# Patient Record
Sex: MALE | Race: WHITE | NOT HISPANIC OR LATINO | Employment: FULL TIME | URBAN - NONMETROPOLITAN AREA
[De-identification: names, ages, dates, MRNs, and addresses within clinical notes are randomized per-mention and may not be internally consistent; named-entity substitution may affect disease eponyms.]

---

## 2018-02-02 ENCOUNTER — OFFICE VISIT (OUTPATIENT)
Dept: FAMILY MEDICINE CLINIC | Facility: CLINIC | Age: 28
End: 2018-02-02

## 2018-02-02 VITALS
HEART RATE: 96 BPM | SYSTOLIC BLOOD PRESSURE: 110 MMHG | BODY MASS INDEX: 28.12 KG/M2 | OXYGEN SATURATION: 98 % | WEIGHT: 175 LBS | HEIGHT: 66 IN | DIASTOLIC BLOOD PRESSURE: 78 MMHG

## 2018-02-02 DIAGNOSIS — Z13.1 SCREENING FOR DIABETES MELLITUS: ICD-10-CM

## 2018-02-02 DIAGNOSIS — F51.05 INSOMNIA DUE TO OTHER MENTAL DISORDER: ICD-10-CM

## 2018-02-02 DIAGNOSIS — F22 PARANOIA (HCC): ICD-10-CM

## 2018-02-02 DIAGNOSIS — F40.01 AGORAPHOBIA WITH PANIC ATTACKS: ICD-10-CM

## 2018-02-02 DIAGNOSIS — R45.86 MOOD SWINGS: Primary | ICD-10-CM

## 2018-02-02 DIAGNOSIS — F99 INSOMNIA DUE TO OTHER MENTAL DISORDER: ICD-10-CM

## 2018-02-02 DIAGNOSIS — R45.4 IRRITABILITY: ICD-10-CM

## 2018-02-02 LAB
ALBUMIN SERPL-MCNC: 4.5 G/DL (ref 3.5–5)
ALBUMIN/GLOB SERPL: 1.7 G/DL (ref 1–2)
ALP SERPL-CCNC: 70 U/L (ref 38–126)
ALT SERPL-CCNC: 55 U/L (ref 13–69)
AST SERPL-CCNC: 35 U/L (ref 15–46)
BASOPHILS # BLD AUTO: 0.08 10*3/MM3 (ref 0–0.2)
BASOPHILS NFR BLD AUTO: 1.1 % (ref 0–2.5)
BILIRUB SERPL-MCNC: 0.4 MG/DL (ref 0.2–1.3)
BUN SERPL-MCNC: 11 MG/DL (ref 7–20)
BUN/CREAT SERPL: 13.8 (ref 6.3–21.9)
CALCIUM SERPL-MCNC: 9.6 MG/DL (ref 8.4–10.2)
CHLORIDE SERPL-SCNC: 104 MMOL/L (ref 98–107)
CO2 SERPL-SCNC: 24 MMOL/L (ref 26–30)
CREAT SERPL-MCNC: 0.8 MG/DL (ref 0.6–1.3)
EOSINOPHIL # BLD AUTO: 0.13 10*3/MM3 (ref 0–0.7)
EOSINOPHIL NFR BLD AUTO: 1.8 % (ref 0–7)
ERYTHROCYTE [DISTWIDTH] IN BLOOD BY AUTOMATED COUNT: 14.5 % (ref 11.5–14.5)
ERYTHROCYTE [SEDIMENTATION RATE] IN BLOOD BY WESTERGREN METHOD: 8 MM/HR (ref 0–15)
GFR SERPLBLD CREATININE-BSD FMLA CKD-EPI: 117 ML/MIN/1.73
GFR SERPLBLD CREATININE-BSD FMLA CKD-EPI: 142 ML/MIN/1.73
GLOBULIN SER CALC-MCNC: 2.7 GM/DL
GLUCOSE SERPL-MCNC: 85 MG/DL (ref 74–98)
HBA1C MFR BLD: 5.8 %
HCT VFR BLD AUTO: 42.3 % (ref 42–52)
HGB BLD-MCNC: 14.5 G/DL (ref 14–18)
IMM GRANULOCYTES # BLD: 0.02 10*3/MM3 (ref 0–0.06)
IMM GRANULOCYTES NFR BLD: 0.3 % (ref 0–0.6)
LYMPHOCYTES # BLD AUTO: 1.75 10*3/MM3 (ref 0.6–3.4)
LYMPHOCYTES NFR BLD AUTO: 23.9 % (ref 10–50)
MCH RBC QN AUTO: 29.1 PG (ref 27–31)
MCHC RBC AUTO-ENTMCNC: 34.3 G/DL (ref 30–37)
MCV RBC AUTO: 84.8 FL (ref 80–94)
MONOCYTES # BLD AUTO: 0.69 10*3/MM3 (ref 0–0.9)
MONOCYTES NFR BLD AUTO: 9.4 % (ref 0–12)
NEUTROPHILS # BLD AUTO: 4.66 10*3/MM3 (ref 2–6.9)
NEUTROPHILS NFR BLD AUTO: 63.5 % (ref 37–80)
NRBC BLD AUTO-RTO: 0 /100 WBC (ref 0–0)
PLATELET # BLD AUTO: 251 10*3/MM3 (ref 130–400)
POTASSIUM SERPL-SCNC: 4.2 MMOL/L (ref 3.5–5.1)
PROT SERPL-MCNC: 7.2 G/DL (ref 6.3–8.2)
RBC # BLD AUTO: 4.99 10*6/MM3 (ref 4.7–6.1)
SODIUM SERPL-SCNC: 143 MMOL/L (ref 137–145)
T4 FREE SERPL-MCNC: 0.88 NG/DL (ref 0.78–2.19)
TSH SERPL DL<=0.005 MIU/L-ACNC: 10.3 MIU/ML (ref 0.47–4.68)
VIT B12 SERPL-MCNC: 317 PG/ML (ref 239–931)
WBC # BLD AUTO: 7.33 10*3/MM3 (ref 4.8–10.8)

## 2018-02-02 PROCEDURE — 99204 OFFICE O/P NEW MOD 45 MIN: CPT | Performed by: FAMILY MEDICINE

## 2018-02-02 RX ORDER — DIVALPROEX SODIUM 250 MG/1
250 TABLET, DELAYED RELEASE ORAL 2 TIMES DAILY
Qty: 60 TABLET | Refills: 0 | Status: SHIPPED | OUTPATIENT
Start: 2018-02-02 | End: 2018-03-01 | Stop reason: SDUPTHER

## 2018-02-02 NOTE — PROGRESS NOTES
"Ryland Caldwell is a 27 y.o. male.   Mr. Caldwell presents today with his wife to establish care as a new pt. He denies having had a regular PCP in recent years. His main complaints today is that of needing help with his anxiety. Of note, Mr. Caldwell is a somewhat limited due to reasons described in PEx below.    Anxiety   Presents for initial visit. Onset was more than 5 years ago (has had trouble \"since he was a kid\"). The problem has been waxing and waning (overall worsening). Symptoms include compulsions, confusion, decreased concentration, depressed mood (sometimes), excessive worry, insomnia, irritability, muscle tension, nervous/anxious behavior, obsessions, panic and restlessness. Patient reports no chest pain, dizziness, dry mouth, malaise, nausea, palpitations, shortness of breath or suicidal ideas. Symptoms occur most days. The severity of symptoms is interfering with daily activities, causing significant distress and severe. The symptoms are aggravated by caffeine, family issues, social activities, work stress and specific phobias. The quality of sleep is fair. Nighttime awakenings: one to two (sometimes more).     Risk factors include family history. His past medical history is significant for anxiety/panic attacks and depression. There is no history of anemia, arrhythmia, asthma, CAD, chronic lung disease or hyperthyroidism. Past treatments include non-benzodiazephine anxiolytics and counseling (CBT). The treatment provided no relief. Compliance with prior treatments has been good.     Mr. Caldwell describes his symptoms as being \"anxious all the time\". He has chronic agoraphobia, often has trouble being around crowds, in large stores. Has had to leave social gathering, shopping centers many times due to panic. He has chronic moderate to severe paranoia, has recurring nightmares which are generally apocalyptic in nature. He has had both auditory and visual hallucinations in past. He describes " "these as \"visions from God\" or \"message from God\". He denies command auditory hallucinations. No SI, HI. He has been reluctant to seek help in past as he did not want people \"to think he was crazy\". He has failed buspar in past. Also did not feel counseling was helpful. He admits he \"often lied just to get out of there\".     He has father and cousin with h/o suicide. Denies known family h/o schizophrenia. Mother, cousins with h/o severe anxiety. He was admitted to [facility] approx 2 years ago for psychiatric reasons \"but not really suicidal\". He does not recall ever being told he had bipolar DO. Does not recall a change in symptoms in late adolescence or early adulthood. Seems he's \"always felt like this\". He recalls getting good grades in school but always being restless, active in class, getting in fights- never dx'd with ADHD although he has multiple family members with dx.    He admits to drug use in past. Denies IVDU. Had admission for what sounds like rhadomyolysis and acute renal failure few years ago. Has used marijuana intermittently to tx symptoms and reports friends/family say he is \"much more relaxed that way\".    He reports having trouble keeping jobs as his \"anxiety\" leads to being fired. 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 He has also had trouble with maintaining relationships. Has had children with at least 2 women, including current partner. He has had at least 3 \"serious\" relationship as an adult. He has had episodes of impulsivity in which he \"suddenly left family for no reason for several hours\", spend large amounts of money, participated in high risk behaviors including illicit substances, EtOH.      He has had distinct episodes of depression in which usually for about 3 days he has severe fatigue, dysphoric mood, trouble getting out of bed.    The following portions of the patient's history were reviewed and updated as appropriate: allergies, current medications, past " family history, past medical history, past social history, past surgical history and problem list.    Review of Systems   Constitutional: Positive for appetite change, irritability and unexpected weight change.   HENT: Positive for nosebleeds. Negative for ear pain, hearing loss, rhinorrhea, sneezing, tinnitus and trouble swallowing.    Eyes: Positive for visual disturbance. Negative for pain, discharge and redness.   Respiratory: Negative for chest tightness, shortness of breath and wheezing.    Cardiovascular: Negative for chest pain, palpitations and leg swelling.   Gastrointestinal: Positive for abdominal pain (mild first thing in morning). Negative for blood in stool, constipation, diarrhea and nausea.   Endocrine: Negative for cold intolerance, heat intolerance, polydipsia and polyuria.   Genitourinary: Negative for dysuria, flank pain, frequency, hematuria and urgency.   Musculoskeletal: Positive for back pain (mild/moderate, b/n shoulder blades).   Skin: Negative for wound.   Allergic/Immunologic: Negative for food allergies.   Neurological: Negative for dizziness, tremors, seizures, syncope and speech difficulty.   Hematological: Negative for adenopathy. Does not bruise/bleed easily.   Psychiatric/Behavioral: Positive for agitation, confusion, decreased concentration, dysphoric mood, hallucinations and sleep disturbance. Negative for suicidal ideas. The patient is nervous/anxious and has insomnia.        Objective    Vitals:    02/02/18 0940   BP: 110/78   Pulse: 96   SpO2: 98%     Body mass index is 28.25 kg/(m^2).  Last 2 weights    02/02/18  0940   Weight: 79.4 kg (175 lb)       Physical Exam   Constitutional: He is oriented to person, place, and time. He appears well-developed and well-nourished. He is cooperative. He does not appear ill. No distress.   HENT:   Head: Normocephalic and atraumatic.   Right Ear: Tympanic membrane, external ear and ear canal normal.   Left Ear: Tympanic membrane, external  ear and ear canal normal.   Nose: Nose normal. No mucosal edema or rhinorrhea.   Mouth/Throat: Oropharynx is clear and moist and mucous membranes are normal. Mucous membranes are not dry. No oral lesions. No posterior oropharyngeal erythema.   Eyes: Conjunctivae, EOM and lids are normal.   Neck: Phonation normal. Neck supple. Normal carotid pulses present. No thyroid mass and no thyromegaly present.   Cardiovascular: Normal rate, regular rhythm, S1 normal, S2 normal and intact distal pulses.  Exam reveals no gallop.    No murmur heard.  Pulmonary/Chest: Effort normal and breath sounds normal.   Abdominal: Soft. Bowel sounds are normal. He exhibits no distension and no mass. There is no hepatosplenomegaly. There is no tenderness.   Musculoskeletal: Normal range of motion. He exhibits no edema, tenderness or deformity.       Vascular Status -  His exam exhibits no right foot edema. His exam exhibits no left foot edema.  Lymphadenopathy:     He has no cervical adenopathy.   Neurological: He is alert and oriented to person, place, and time. He has normal strength and normal reflexes. He displays no tremor. No cranial nerve deficit or sensory deficit. Gait normal.   Skin: Skin is warm and dry. No ecchymosis and no rash noted. He is not diaphoretic.   Psychiatric: His mood appears anxious. His affect is labile. His speech is rapid and/or pressured and tangential. He is hyperactive. He is not aggressive and not actively hallucinating. Thought content is paranoid. Cognition and memory are normal. He expresses no homicidal and no suicidal ideation. He is inattentive (mildly).   Nursing note and vitals reviewed.      Assessment/Plan   Chaparro was seen today for establish care, polydipsia, anxiety and back pain.    Diagnoses and all orders for this visit:    Mood swings  -     TSH Rfx On Abnormal To Free T4  -     Comprehensive Metabolic Panel  -     Vitamin B12  -     CBC & Differential  -     Sedimentation Rate  -      divalproex (DEPAKOTE) 250 MG DR tablet; Take 1 tablet by mouth 2 (Two) Times a Day.    Agoraphobia with panic attacks  -     TSH Rfx On Abnormal To Free T4  -     Comprehensive Metabolic Panel  -     Vitamin B12  -     CBC & Differential  -     Sedimentation Rate  -     divalproex (DEPAKOTE) 250 MG DR tablet; Take 1 tablet by mouth 2 (Two) Times a Day.    Irritability  -     TSH Rfx On Abnormal To Free T4  -     Comprehensive Metabolic Panel  -     Vitamin B12  -     CBC & Differential  -     Sedimentation Rate  -     divalproex (DEPAKOTE) 250 MG DR tablet; Take 1 tablet by mouth 2 (Two) Times a Day.    Insomnia due to other mental disorder  -     TSH Rfx On Abnormal To Free T4  -     Comprehensive Metabolic Panel  -     Vitamin B12  -     CBC & Differential  -     Sedimentation Rate  -     divalproex (DEPAKOTE) 250 MG DR tablet; Take 1 tablet by mouth 2 (Two) Times a Day.    Paranoia  -     TSH Rfx On Abnormal To Free T4  -     Comprehensive Metabolic Panel  -     Vitamin B12  -     CBC & Differential  -     Sedimentation Rate    Screening for diabetes mellitus  -     Hemoglobin A1c    Mr. Caldwell appears to have significant anxiety with agoraphobia but also significant features concerning for Bipolar DO, manic phase with possible psychosis. May have comorbid ADHD. No active SI, HI. He appears to have insight into nature of problem, wishes to receive tx but does not wish to see a psychiatrist at this time. I have encouraged him to reconsider as the nature of his problem may require consultation with specialist for accurate dx and tx.    I have reviewed risks/benefits and potential side effects of various treatment options for mood lability assoc'd with anxiety, insomnia, agitation. Pt voices understanding and wishes to proceed with trial of depakote. I have informed him tx may take time, he may required multiple medications to control symptoms, and that tx compliance and open communication are essential. Will r/o  other causes of symptoms with labs as above. I have also requested records from [facility].    I will contact patient regarding test results and provide instructions regarding any necessary changes in plan of care.  He is to f/u in 1 month, sooner as needed/instructed.  Patient was encouraged to keep me informed of any acute changes, lack of improvement, or any new concerning symptoms.  Pt is aware of reasons to seek emergent care.  Patient voiced understanding of all instructions and denied further questions.

## 2018-02-03 PROBLEM — R45.86 MOOD SWINGS: Status: ACTIVE | Noted: 2018-02-03

## 2018-02-03 PROBLEM — F40.01 PANIC DISORDER WITH AGORAPHOBIA: Status: ACTIVE | Noted: 2018-02-03

## 2018-02-03 PROBLEM — F22 PARANOIA (HCC): Status: ACTIVE | Noted: 2018-02-03

## 2018-02-03 PROBLEM — F99 INSOMNIA DUE TO OTHER MENTAL DISORDER: Status: ACTIVE | Noted: 2018-02-03

## 2018-02-03 PROBLEM — F51.05 INSOMNIA DUE TO OTHER MENTAL DISORDER: Status: ACTIVE | Noted: 2018-02-03

## 2018-02-03 PROBLEM — F41.9 ANXIETY: Status: ACTIVE | Noted: 2018-02-03

## 2018-02-06 PROBLEM — R79.89 HIGH THYROID STIMULATING HORMONE (TSH) LEVEL: Status: ACTIVE | Noted: 2018-02-06

## 2018-02-06 PROBLEM — R73.03 PREDIABETES: Status: ACTIVE | Noted: 2018-02-06

## 2018-02-09 ENCOUNTER — TELEPHONE (OUTPATIENT)
Dept: FAMILY MEDICINE CLINIC | Facility: CLINIC | Age: 28
End: 2018-02-09

## 2018-02-09 NOTE — TELEPHONE ENCOUNTER
----- Message from Jenni Olmedo MD sent at 2/6/2018  1:39 PM EST -----  Please inform pt his recent labs show:  1) abnormal thyroid function; he will need f/u labs at his next visit to help determine cause  2) normal liver and kidney function  3) low normal B12; he may wish to start an over the counter B12 supplement  4) no anemia or signs of infection  5) Does have prediabetes; will continue to monitor. He needs to avoid sweet foods, sugary beverages, junk foods, etc.

## 2018-02-14 ENCOUNTER — TELEPHONE (OUTPATIENT)
Dept: FAMILY MEDICINE CLINIC | Facility: CLINIC | Age: 28
End: 2018-02-14

## 2018-02-14 NOTE — TELEPHONE ENCOUNTER
Phyllis called stating the depakote isnt helping his anxiety yet, and wanted to see if you could try something else.

## 2018-02-16 RX ORDER — ZIPRASIDONE HYDROCHLORIDE 20 MG/1
20 CAPSULE ORAL 2 TIMES DAILY WITH MEALS
Qty: 60 CAPSULE | Refills: 1 | Status: SHIPPED | OUTPATIENT
Start: 2018-02-16

## 2018-02-16 NOTE — TELEPHONE ENCOUNTER
He should continue depakote and I have sent new prescription into pharmacy for him to take along with it. He should keep his f/u apt early next month.

## 2018-03-01 DIAGNOSIS — F40.01 AGORAPHOBIA WITH PANIC ATTACKS: ICD-10-CM

## 2018-03-01 DIAGNOSIS — F51.05 INSOMNIA DUE TO OTHER MENTAL DISORDER: ICD-10-CM

## 2018-03-01 DIAGNOSIS — R45.4 IRRITABILITY: ICD-10-CM

## 2018-03-01 DIAGNOSIS — F99 INSOMNIA DUE TO OTHER MENTAL DISORDER: ICD-10-CM

## 2018-03-01 DIAGNOSIS — R45.86 MOOD SWINGS: ICD-10-CM

## 2018-03-02 RX ORDER — DIVALPROEX SODIUM 250 MG/1
TABLET, DELAYED RELEASE ORAL
Qty: 60 TABLET | Refills: 0 | Status: SHIPPED | OUTPATIENT
Start: 2018-03-02 | End: 2018-03-07 | Stop reason: SDUPTHER

## 2018-03-07 ENCOUNTER — OFFICE VISIT (OUTPATIENT)
Dept: FAMILY MEDICINE CLINIC | Facility: CLINIC | Age: 28
End: 2018-03-07

## 2018-03-07 VITALS
BODY MASS INDEX: 31.98 KG/M2 | DIASTOLIC BLOOD PRESSURE: 74 MMHG | TEMPERATURE: 98.2 F | HEART RATE: 100 BPM | SYSTOLIC BLOOD PRESSURE: 128 MMHG | WEIGHT: 199 LBS | OXYGEN SATURATION: 98 % | HEIGHT: 66 IN

## 2018-03-07 DIAGNOSIS — E66.09 CLASS 1 OBESITY DUE TO EXCESS CALORIES WITH SERIOUS COMORBIDITY AND BODY MASS INDEX (BMI) OF 32.0 TO 32.9 IN ADULT: ICD-10-CM

## 2018-03-07 DIAGNOSIS — F40.01 AGORAPHOBIA WITH PANIC ATTACKS: ICD-10-CM

## 2018-03-07 DIAGNOSIS — R63.5 ABNORMAL WEIGHT GAIN: ICD-10-CM

## 2018-03-07 DIAGNOSIS — R45.4 IRRITABILITY: ICD-10-CM

## 2018-03-07 DIAGNOSIS — R45.86 MOOD SWINGS: ICD-10-CM

## 2018-03-07 DIAGNOSIS — F51.05 INSOMNIA DUE TO OTHER MENTAL DISORDER: ICD-10-CM

## 2018-03-07 DIAGNOSIS — E03.8 SUBCLINICAL HYPOTHYROIDISM: ICD-10-CM

## 2018-03-07 DIAGNOSIS — R79.89 HIGH THYROID STIMULATING HORMONE (TSH) LEVEL: Primary | ICD-10-CM

## 2018-03-07 DIAGNOSIS — F99 INSOMNIA DUE TO OTHER MENTAL DISORDER: ICD-10-CM

## 2018-03-07 DIAGNOSIS — R73.03 PREDIABETES: ICD-10-CM

## 2018-03-07 PROCEDURE — 99214 OFFICE O/P EST MOD 30 MIN: CPT | Performed by: FAMILY MEDICINE

## 2018-03-07 RX ORDER — CYANOCOBALAMIN (VITAMIN B-12) 5000 MCG
TABLET,DISINTEGRATING ORAL
COMMUNITY

## 2018-03-07 RX ORDER — DIVALPROEX SODIUM 250 MG/1
250 TABLET, DELAYED RELEASE ORAL NIGHTLY
Qty: 30 TABLET | Refills: 2 | Status: SHIPPED | OUTPATIENT
Start: 2018-03-07

## 2018-03-07 NOTE — PROGRESS NOTES
"Ryland Caldwell is a 27 y.o. male.     History of Present Illness  Mr. Caldwell presents today for f/u on visit as a new pt 1 month ago. At that time he was a limited historian but presented with c/o anxiety.   Onset was more than 5 years ago (has had trouble \"since he was a kid\"). The problem had been waxing and waning (overall worsening). Symptoms include compulsions, confusion, decreased concentration, depressed mood (sometimes), excessive worry, insomnia, irritability, muscle tension, nervous/anxious behavior, obsessions, panic and restlessness. Patient reported no chest pain, dizziness, dry mouth, malaise, nausea, palpitations, shortness of breath or suicidal ideas. Symptoms occured most days. The severity of symptoms was interfering with daily activities, causing significant distress and relational/work problems. The symptoms were aggravated by caffeine, family issues, social activities, work stress and specific phobias. The quality of sleep was fair and fluctuating. Nighttime awakenings: one to two (sometimes more). Risk factors include family history. His past medical history is significant for anxiety/panic attacks and depression. There is no history of anemia, arrhythmia, asthma, CAD, chronic lung disease or hyperthyroidism. Past treatments included non-benzodiazephine anxiolytics and counseling (CBT). The treatment provided no relief. Compliance with prior treatments has been good.      Mr. Caldwell described his symptoms as being \"anxious all the time\". He has chronic agoraphobia, often has trouble being around crowds, in large stores. Has had to leave social gathering, shopping centers many times due to panic. He has chronic moderate to severe paranoia, has recurring nightmares which are generally apocalyptic in nature. He has had both auditory and visual hallucinations in past. He describes these as \"visions from God\" or \"message from God\". He denies command auditory hallucinations. No SI, HI. " "He has been reluctant to seek help in past as he did not want people \"to think he was crazy\". He has failed buspar in past. Also did not feel counseling was helpful. He admits he \"often lied just to get out of there\".      He has father and cousin with h/o suicide. Denies known family h/o schizophrenia. Mother, cousins with h/o severe anxiety. He was admitted to [facility] approx 2 years ago for psychiatric reasons \"but not really suicidal\". He does not recall ever being told he had bipolar DO. Does not recall a change in symptoms in late adolescence or early adulthood. Seems he's \"always felt like this\". He recalls getting good grades in school but always being restless, active in class, getting in fights- never dx'd with ADHD although he has multiple family members with dx.     He admitted to drug use in past. Denied IVDU. Had admission for what sounds like rhadomyolysis and acute renal failure few years ago. Has used marijuana intermittently to tx symptoms and reports friends/family say he is \"much more relaxed that way\".     He reports having trouble keeping jobs as his \"anxiety\" leads to being fired. 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 He has also had trouble with maintaining relationships. Has had children with at least 2 women, including current partner. He has had at least 3 \"serious\" relationship as an adult (age 27). He has had episodes of impulsivity in which he \"suddenly left family for no reason for several hours\", spend large amounts of money, participated in high risk behaviors including illicit substances, EtOH.       He has had distinct episodes of depression during which he usually stays in bed for about 3 days; has severe fatigue, dysphoric mood, trouble getting out of bed.    At last visit I suspected he had possibel bipolar DO, possible ADHD, possible panic DO with agoraphobia, STONE, etc. I started him on geodon and depakote bid. He returns today with his partner for recheck. " "He has gained over 20 lbs. He is sleeping \"much better but now too much\". He feels \"tired during the day\". His moods are much improved. He feels less anxious, less restless. He denies auditory or visual hallucinations. No psychotic behaviors. Overall he \"feels better\". He reports his weight has markedly fluctuated in the past as well.     Of note his labs showed abnormalities as below. He denies h/o thyroid dz.     The following portions of the patient's history were reviewed and updated as appropriate: allergies, current medications, past family history, past medical history, past social history, past surgical history and problem list.    Review of Systems   Constitutional: Positive for appetite change and unexpected weight change.   HENT: Negative for ear pain, hearing loss, rhinorrhea, sneezing, tinnitus and trouble swallowing.    Eyes: Positive for visual disturbance. Negative for pain.   Respiratory: Negative for cough, chest tightness, shortness of breath and wheezing.    Cardiovascular: Negative for chest pain, palpitations and leg swelling.   Gastrointestinal: Positive for abdominal pain (mild first thing in morning). Negative for blood in stool, constipation, diarrhea and nausea.   Endocrine: Negative for cold intolerance, heat intolerance, polydipsia and polyuria.   Genitourinary: Negative for dysuria, flank pain, frequency, hematuria and urgency.   Musculoskeletal: Positive for back pain (mild/moderate, b/n shoulder blades).   Skin: Negative for rash and wound.   Neurological: Negative for dizziness, tremors, seizures, syncope and speech difficulty.   Hematological: Negative for adenopathy. Does not bruise/bleed easily.   Psychiatric/Behavioral: Positive for dysphoric mood. Negative for agitation, confusion, decreased concentration, hallucinations, sleep disturbance and suicidal ideas. The patient is nervous/anxious.        Objective    Vitals:    03/07/18 1605   BP: 128/74   Pulse: 100   Temp: 98.2 °F " (36.8 °C)   SpO2: 98%     Body mass index is 32.12 kg/(m^2).  Last 2 weights    03/07/18  1605   Weight: 90.3 kg (199 lb)       Physical Exam   Constitutional: He is oriented to person, place, and time. He appears well-developed and well-nourished. He is cooperative. He does not appear ill. No distress.   obese   HENT:   Head: Normocephalic and atraumatic.   Mouth/Throat: Oropharynx is clear and moist and mucous membranes are normal. Mucous membranes are not dry. No oral lesions.   Eyes: Conjunctivae, EOM and lids are normal. Right eye exhibits no nystagmus. Left eye exhibits no nystagmus.   Neck: Phonation normal. Neck supple. Normal carotid pulses present. No thyroid mass present.   Cardiovascular: Normal rate, regular rhythm, normal heart sounds and intact distal pulses.  Exam reveals no gallop.    No murmur heard.  Pulmonary/Chest: Effort normal and breath sounds normal.       Vascular Status -  His exam exhibits no right foot edema. His exam exhibits no left foot edema.  Neurological: He is alert and oriented to person, place, and time. He has normal strength. He displays no tremor. Gait normal.   Skin: Skin is warm and dry. No rash noted.   Psychiatric: He has a normal mood and affect. His behavior is normal. Thought content normal. His mood appears not anxious. His affect is not labile. His speech is not rapid and/or pressured. He is not hyperactive. Thought content is not paranoid and not delusional. He does not express impulsivity. He expresses no suicidal ideation.   Marked change from last visit at which he was restless, agitated, anxious, with pressured speech, tangential, labile He is attentive.   Nursing note and vitals reviewed.    Recent Results (from the past 1008 hour(s))   TSH Rfx On Abnormal To Free T4    Collection Time: 02/02/18 10:53 AM   Result Value Ref Range    TSH 10.3 (H) 0.47 - 4.68 mIU/mL   Comprehensive Metabolic Panel    Collection Time: 02/02/18 10:53 AM   Result Value Ref Range     Glucose 85 74 - 98 mg/dL    BUN 11 7 - 20 mg/dL    Creatinine 0.80 0.60 - 1.30 mg/dL    eGFR Non African Am 117 >60 mL/min/1.73    eGFR African Am 142 >60 mL/min/1.73    BUN/Creatinine Ratio 13.8 6.3 - 21.9    Sodium 143 137 - 145 mmol/L    Potassium 4.2 3.5 - 5.1 mmol/L    Chloride 104 98 - 107 mmol/L    Total CO2 24.0 (L) 26.0 - 30.0 mmol/L    Calcium 9.6 8.4 - 10.2 mg/dL    Total Protein 7.2 6.3 - 8.2 g/dL    Albumin 4.50 3.50 - 5.00 g/dL    Globulin 2.7 gm/dL    A/G Ratio 1.7 1.0 - 2.0 g/dL    Total Bilirubin 0.4 0.2 - 1.3 mg/dL    Alkaline Phosphatase 70 38 - 126 U/L    AST (SGOT) 35 15 - 46 U/L    ALT (SGPT) 55 13 - 69 U/L   Vitamin B12    Collection Time: 02/02/18 10:53 AM   Result Value Ref Range    Vitamin B-12 317 239 - 931 pg/mL   CBC & Differential    Collection Time: 02/02/18 10:53 AM   Result Value Ref Range    WBC 7.33 4.80 - 10.80 10*3/mm3    RBC 4.99 4.70 - 6.10 10*6/mm3    Hemoglobin 14.5 14.0 - 18.0 g/dL    Hematocrit 42.3 42.0 - 52.0 %    MCV 84.8 80.0 - 94.0 fL    MCH 29.1 27.0 - 31.0 pg    MCHC 34.3 30.0 - 37.0 g/dL    RDW 14.5 11.5 - 14.5 %    Platelets 251 130 - 400 10*3/mm3    Neutrophil Rel % 63.5 37.0 - 80.0 %    Lymphocyte Rel % 23.9 10.0 - 50.0 %    Monocyte Rel % 9.4 0.0 - 12.0 %    Eosinophil Rel % 1.8 0.0 - 7.0 %    Basophil Rel % 1.1 0.0 - 2.5 %    Neutrophils Absolute 4.66 2.00 - 6.90 10*3/mm3    Lymphocytes Absolute 1.75 0.60 - 3.40 10*3/mm3    Monocytes Absolute 0.69 0.00 - 0.90 10*3/mm3    Eosinophils Absolute 0.13 0.00 - 0.70 10*3/mm3    Basophils Absolute 0.08 0.00 - 0.20 10*3/mm3    Immature Granulocyte Rel % 0.3 0.0 - 0.6 %    Immature Grans Absolute 0.02 0.00 - 0.06 10*3/mm3    nRBC 0.0 0.0 - 0.0 /100 WBC   Hemoglobin A1c    Collection Time: 02/02/18 10:53 AM   Result Value Ref Range    Hemoglobin A1C 5.80 %   Sedimentation Rate    Collection Time: 02/02/18 10:53 AM   Result Value Ref Range    Sed Rate 8 0 - 15 mm/hr   T4F    Collection Time: 02/02/18 10:53 AM   Result Value  Ref Range    Free T4 0.88 0.78 - 2.19 ng/dL     Assessment/Plan   Chaparro was seen today for follow-up and insomnia.    Diagnoses and all orders for this visit:    High thyroid stimulating hormone (TSH) level  -     TSH  -     T4, Free  -     T3, Free    Mood swings  -     divalproex (DEPAKOTE) 250 MG DR tablet; Take 1 tablet by mouth Every Night.    Agoraphobia with panic attacks  -     divalproex (DEPAKOTE) 250 MG DR tablet; Take 1 tablet by mouth Every Night.    Irritability  -     divalproex (DEPAKOTE) 250 MG DR tablet; Take 1 tablet by mouth Every Night.    Insomnia due to other mental disorder  -     divalproex (DEPAKOTE) 250 MG DR tablet; Take 1 tablet by mouth Every Night.    Prediabetes    Abnormal weight gain    Class 1 obesity due to excess calories with serious comorbidity and body mass index (BMI) of 32.0 to 32.9 in adult    Subclinical hypothyroidism    Suspected Bipolar DO assoc'd with agoraphobia/panic. Overall much improved with depakote and geodon bid but has had signficant weight gain and feels fatigued. For that reason will continue geodon bid, decreaed depakote to qhs only. Will adjust meds further if fatigue/weight gain persists. He declines referral to psychiatrist once again. Denies SI, HI. No psychotic behavior/symptoms at this time. Risks, benefits, and potential side effects of medications reviewed with patient.  Patient voiced understanding and wished to proceed with treatment.    Needs recheck TSH and thyroid levels as this could certainly be affecting mood, weight, sleep, etc.    Prediabetes with obesity. Pt advised to eat a heart healthy diet and get regular aerobic exercise.  Nutrition and activity goals reviewed including: mainly water to drink, limit white flour/processed sugar, high protein, high fiber carbs, good breakfast, working toward 150 mins cardio per week, weight training 2x/week    Routine f/u in 1 month, sooner as needed/instructed.  He will need FLP in future when  fasting.  I will contact patient regarding test results and provide instructions regarding any necessary changes in plan of care.  Patient was encouraged to keep me informed of any acute changes, lack of improvement, or any new concerning symptoms.  Pt is aware of reasons to seek emergent care.  Patient voiced understanding of all instructions and denied further questions.

## 2018-03-08 PROBLEM — E66.09 CLASS 1 OBESITY DUE TO EXCESS CALORIES WITH SERIOUS COMORBIDITY AND BODY MASS INDEX (BMI) OF 32.0 TO 32.9 IN ADULT: Status: ACTIVE | Noted: 2018-03-08

## 2018-03-08 PROBLEM — R63.5 ABNORMAL WEIGHT GAIN: Status: ACTIVE | Noted: 2018-03-08

## 2018-03-08 PROBLEM — E03.8 SUBCLINICAL HYPOTHYROIDISM: Status: ACTIVE | Noted: 2018-03-08

## 2018-03-09 LAB
T3FREE SERPL-MCNC: 3.1 PG/ML (ref 2–4.4)
T4 FREE SERPL-MCNC: 0.61 NG/DL (ref 0.78–2.19)
TSH SERPL DL<=0.005 MIU/L-ACNC: 4.94 MIU/ML (ref 0.47–4.68)

## 2018-03-12 RX ORDER — LEVOTHYROXINE SODIUM 0.03 MG/1
TABLET ORAL
Qty: 30 TABLET | Refills: 2 | Status: SHIPPED | OUTPATIENT
Start: 2018-03-12